# Patient Record
Sex: FEMALE | Race: BLACK OR AFRICAN AMERICAN | NOT HISPANIC OR LATINO | Employment: FULL TIME | URBAN - METROPOLITAN AREA
[De-identification: names, ages, dates, MRNs, and addresses within clinical notes are randomized per-mention and may not be internally consistent; named-entity substitution may affect disease eponyms.]

---

## 2017-03-17 DIAGNOSIS — K22.4 ESOPHAGEAL SPASM: ICD-10-CM

## 2017-03-17 RX ORDER — AMITRIPTYLINE HYDROCHLORIDE 50 MG/1
50 TABLET, FILM COATED ORAL NIGHTLY
Qty: 90 TABLET | Refills: 1 | Status: SHIPPED | OUTPATIENT
Start: 2017-03-17 | End: 2017-09-05 | Stop reason: SDUPTHER

## 2017-09-05 DIAGNOSIS — K22.4 ESOPHAGEAL SPASM: ICD-10-CM

## 2017-09-05 RX ORDER — AMITRIPTYLINE HYDROCHLORIDE 50 MG/1
50 TABLET, FILM COATED ORAL NIGHTLY
Qty: 90 TABLET | Refills: 1 | Status: SHIPPED | OUTPATIENT
Start: 2017-09-05 | End: 2017-12-06 | Stop reason: SDUPTHER

## 2017-09-05 RX ORDER — SUCRALFATE 1 G/10ML
1 SUSPENSION ORAL
Qty: 414 ML | Refills: 3 | Status: SHIPPED | OUTPATIENT
Start: 2017-09-05

## 2017-09-06 ENCOUNTER — TELEPHONE (OUTPATIENT)
Dept: GASTROENTEROLOGY | Facility: CLINIC | Age: 57
End: 2017-09-06

## 2017-09-06 NOTE — TELEPHONE ENCOUNTER
Spoke with patient.  Aware Dr. Ford would like for her to set up an ov.  Offered mid level appointment but patient stated she would prefer to wait to see .  Patient aware it will be December.  Books are not opened yet.  Will call when available.

## 2017-09-06 NOTE — TELEPHONE ENCOUNTER
----- Message from Swapna Garcia sent at 9/6/2017 11:14 AM CDT -----  Contact: self - [phone]  Logan - returning your call - please call patient at

## 2017-12-06 ENCOUNTER — OFFICE VISIT (OUTPATIENT)
Dept: GASTROENTEROLOGY | Facility: CLINIC | Age: 57
End: 2017-12-06
Payer: COMMERCIAL

## 2017-12-06 VITALS
WEIGHT: 152.13 LBS | SYSTOLIC BLOOD PRESSURE: 133 MMHG | BODY MASS INDEX: 26.95 KG/M2 | HEART RATE: 84 BPM | DIASTOLIC BLOOD PRESSURE: 82 MMHG

## 2017-12-06 DIAGNOSIS — R13.19 ESOPHAGEAL DYSPHAGIA: Primary | ICD-10-CM

## 2017-12-06 DIAGNOSIS — K22.4 ESOPHAGEAL SPASM: ICD-10-CM

## 2017-12-06 PROCEDURE — 99214 OFFICE O/P EST MOD 30 MIN: CPT | Mod: S$GLB,,, | Performed by: INTERNAL MEDICINE

## 2017-12-06 PROCEDURE — 99999 PR PBB SHADOW E&M-EST. PATIENT-LVL II: CPT | Mod: PBBFAC,,, | Performed by: INTERNAL MEDICINE

## 2017-12-06 RX ORDER — HYOSCYAMINE SULFATE 0.12 MG/1
0.12 TABLET SUBLINGUAL
Qty: 90 TABLET | Refills: 6 | Status: SHIPPED | OUTPATIENT
Start: 2017-12-06

## 2017-12-06 RX ORDER — AMITRIPTYLINE HYDROCHLORIDE 75 MG/1
75 TABLET ORAL NIGHTLY
Qty: 30 TABLET | Refills: 6 | Status: SHIPPED | OUTPATIENT
Start: 2017-12-06 | End: 2018-11-14

## 2017-12-07 NOTE — PROGRESS NOTES
This is a followup visit and counseling session for Mrs. Colin.    HISTORY OF PRESENT ILLNESS:  A 57-year-old lady referred from Dr. Avila on the   [facility].  It has been my impression that her problem is due to visceral   hypersensitivity and functional dysphagia.    Overall, she is better than she was two years ago.  She still has symptoms and   they are still present daily.  She has chest pain every night.  She feels that   the food sits in her lower esophagus after eating.  She always has a sensation.    It is worse after larger meals.  It is less severe after small and light meals,   but she has this sensation for hours and she finally feels like the food passes   out of the esophagus.  Because of that, she really essentially avoids eating at   night.  The dilation I did of her lower esophagus did not help at all.  She   also has a burning chest pain.  There is also a left upper quadrant and   epigastric pain.  Liquid Carafate does seem to help the burning chest pain when   she has it.  She takes Nexium 40 mg at night.  I started Elavil and we are now   at 50 mg at night and she feels like this helps.  She feels like specifically   the pain is less severe than it has been in the past.    PAST MEDICATION TRIALS:  She has been on Prevacid, pantoprazole, Protonix,   Prilosec, and Aciphex.  None of these helped very much.  The only thing that   helps is Nexium.  She has discovered that her insurance will not cover the   prescription Nexium.  So she does over-the-counter.    PAST WORKUP:  She has had an extensive workup.  I did an EGD, which was normal   with an 18 mm balloon dilation of the lower esophageal sphincter.  Manometry   here was normal; although it was technically normal, there were a couple of   abnormalities.  There was one drop swallow, one simultaneous swallow at least   suggestive of the fact that there may be a subtle motility disorder.    ASSESSMENT AND DECISION MAKING:  Functional dysphagia.   I have recommended that   we slightly increase the dose of amitriptyline to 75 mg.  I think it is worth to   try Levsin right before meals to see if that helps.  I think her Nexium would   be more effective if she took it before eating, so we discussed the timing of   that.  I think it is reasonable to continue liquid Carafate.  I wonder if the   insurance company would consider Dexilant.  If the timing of the Nexium does not   help her, then I recommend maybe considering a trial of Dexilant.  There is no   reason to do any further workup.  I think we have exhausted all of our studies.    For instance, I do not think EGD with Botox would be effective.  I see no   reason to repeat any of the studies like manometry or barium swallows.  There is   certainly no surgical intervention.  We talked about this because she has had   friends who that have been done for.    RECOMMENDATIONS:  1.  Increase Elavil to 75.  2.  Levsin before meals.  3.  Change the Nexium to before meals, may be twice a day before breakfast and   dinner.  4.  Continue liquid Carafate.    A 25-minute appointment, greater than half the time in face-to-face counseling.      ERIC  dd: 12/06/2017 09:53:21 (CST)  td: 12/07/2017 08:08:32 (CST)  Doc ID   #[id number]  Job ID #[id number]    CC:

## 2018-11-14 ENCOUNTER — OFFICE VISIT (OUTPATIENT)
Dept: GASTROENTEROLOGY | Facility: CLINIC | Age: 58
End: 2018-11-14
Payer: COMMERCIAL

## 2018-11-14 VITALS
HEART RATE: 70 BPM | BODY MASS INDEX: 28.67 KG/M2 | WEIGHT: 161.81 LBS | HEIGHT: 63 IN | DIASTOLIC BLOOD PRESSURE: 82 MMHG | SYSTOLIC BLOOD PRESSURE: 134 MMHG

## 2018-11-14 DIAGNOSIS — K59.89 VISCERAL HYPERSENSITIVITY SYNDROME: ICD-10-CM

## 2018-11-14 DIAGNOSIS — R13.19 ESOPHAGEAL DYSPHAGIA: Primary | ICD-10-CM

## 2018-11-14 PROCEDURE — 99999 PR PBB SHADOW E&M-EST. PATIENT-LVL III: CPT | Mod: PBBFAC,,, | Performed by: INTERNAL MEDICINE

## 2018-11-14 PROCEDURE — 99214 OFFICE O/P EST MOD 30 MIN: CPT | Mod: S$GLB,,, | Performed by: INTERNAL MEDICINE

## 2018-11-14 PROCEDURE — 3008F BODY MASS INDEX DOCD: CPT | Mod: CPTII,S$GLB,, | Performed by: INTERNAL MEDICINE

## 2018-11-14 RX ORDER — AMITRIPTYLINE HYDROCHLORIDE 50 MG/1
50 TABLET, FILM COATED ORAL NIGHTLY
COMMUNITY

## 2018-11-14 RX ORDER — HYDROGEN PEROXIDE 3 %
20 SOLUTION, NON-ORAL MISCELLANEOUS DAILY
COMMUNITY

## 2018-11-14 RX ORDER — METOCLOPRAMIDE 5 MG/1
5 TABLET ORAL
COMMUNITY

## 2018-11-14 NOTE — PROGRESS NOTES
HISTORY OF PRESENT ILLNESS:  This is a followup visit for Ms. Colin.  She is a   58-year-old lady referred by Dr. Avila originally three years ago.  When I saw   her in October 2015, she was having chest pain, dysphagia and epigastric pain.    At that time, I did a manometry, which was essentially normal, although there   were some subtle abnormalities; 1/10 swallows were simultaneous and 2/10   swallows were dropped.  It was my impression at that time that this was   functional dysphagia and visceral hypersensitivity.  Also, in 2016, I did an ED,   which was normal, although I did an empiric dilation.    Since my visit, she has followed up with Dr. Avila.  I started her on Elavil   and I felt like the Elavil was helping somewhat in her pain.  I tried Levsin,   which really did not help.  She was on Carafate, which may or may have not given   relief.    In March 2018, she was hospitalized for colitis.  Apparently, this was probably   a bacterial colitis as she had a followup colonoscopy, which was normal two   months later.    She had a followup visit with Dr. Avila this summer.  Dr. Avial thought about   using a low dose of Reglan to help her with her symptoms and this has worked in   a tremendous fashion.  It was giving her significant relief of her symptoms when   she saw her in September.    She comes in now and she is still getting excellent relief with the Reglan.  The   fullness in the chest is significantly better.  The epigastric discomfort is   significantly better.  She is having less regurgitation, but she still has to be   careful there.  If she barry over after eating, she will definitely have   regurgitation, but she can recline after eating now without any significant   regurgitation.  She is taking the Reglan just 5 mg in the morning and that seems   to give her good relief of symptoms throughout the entire 24-hour period.  Not   only that, she has been able to cut back on her Elavil from 75 to  50 mg and she   has even cut back on her Nexium from 40 to 20 mg.  So, overall, she feels better   both in the chest pain and the epigastric discomfort and the dysphagia.  She   did try lowering the Elavil to 25 mg and then felt like the pain was recurring,   so that may be helping with some of the visceral hypersensitivity symptoms and   she has noticed if she misses a dose of Reglan she will have a very prompt   recurrence of symptoms.    ASSESSMENT AND DECISION MAKING:  Functional dysphagia and visceral   hypersensitivity:  Just like Dr. Avila, I am delighted that the low dose of   Reglan is helping her.  I discussed with her the potential side effects of   Reglan, but I think that if she stays on this very low dose I think the chance   of side effects is minimal and I think she should definitely continue on this   low dose.  I think if she goes a full year and she feels like she wants to try   and stop it or go to every other day, then that be very reasonable.  I do not   think any further workup is necessary at this time specifically.  Even though we   have upgraded our esophageal manometry equipment since her last visit, I see no   value in repeating that at this time.  I would only do that if there is a   worsening of her symptoms or a change or evolution of her symptoms.  I have   asked her to call me if there are any issues, but I do not think she necessarily   needs a followup with me, especially since I can have access to Dr. Avila's   notes on the EP[facility] system now.    Twenty-five-minute appointment, greater than half the time in face-to-face   counseling.      ERIC  dd: 11/14/2018 11:45:01 (CST)  td: 11/15/2018 00:35:49 (CST)  Doc ID   #[id number]  Job ID #[id number]    CC: